# Patient Record
Sex: FEMALE | Race: WHITE | URBAN - METROPOLITAN AREA
[De-identification: names, ages, dates, MRNs, and addresses within clinical notes are randomized per-mention and may not be internally consistent; named-entity substitution may affect disease eponyms.]

---

## 2021-01-13 ENCOUNTER — OFFICE VISIT (OUTPATIENT)
Dept: URBAN - METROPOLITAN AREA CLINIC 118 | Facility: CLINIC | Age: 21
End: 2021-01-13
Payer: COMMERCIAL

## 2021-01-13 ENCOUNTER — WEB ENCOUNTER (OUTPATIENT)
Dept: URBAN - METROPOLITAN AREA CLINIC 118 | Facility: CLINIC | Age: 21
End: 2021-01-13

## 2021-01-13 DIAGNOSIS — R63.6 UNDERWEIGHT DUE TO INADEQUATE CALORIC INTAKE: ICD-10-CM

## 2021-01-13 DIAGNOSIS — K59.04 CHRONIC IDIOPATHIC CONSTIPATION: ICD-10-CM

## 2021-01-13 DIAGNOSIS — R10.84 GENERALIZED ABDOMINAL PAIN: ICD-10-CM

## 2021-01-13 PROCEDURE — 1036F TOBACCO NON-USER: CPT | Performed by: INTERNAL MEDICINE

## 2021-01-13 PROCEDURE — G8482 FLU IMMUNIZE ORDER/ADMIN: HCPCS | Performed by: INTERNAL MEDICINE

## 2021-01-13 PROCEDURE — G8418 CALC BMI BLW LOW PARAM F/U: HCPCS | Performed by: INTERNAL MEDICINE

## 2021-01-13 PROCEDURE — 99204 OFFICE O/P NEW MOD 45 MIN: CPT | Performed by: INTERNAL MEDICINE

## 2021-01-13 PROCEDURE — G8427 DOCREV CUR MEDS BY ELIG CLIN: HCPCS | Performed by: INTERNAL MEDICINE

## 2021-01-13 NOTE — HPI-TODAY'S VISIT:
The patient is a 20-year-old female who self-referred to our clinic after a recent visit to the emergency room.  She has a history of severe constipation for the least the last 5 years, which started around the time of middle school.  She she used to have bowel movements about 1-2 times per week, with significant straining and urgency.  She remembers in her childhood she had relatively normal bowel movements.  There is associated left lower quadrant discomfort, bloating, and occasional urgency when she has have a bowel movement.  She does respond well to MiraLAX or other laxatives, but she was unsure if she could take this on a daily basis, so she will reserve this for times when the constipation was especially severe.  She went to the emergency room recently for acute urinary retention, and it was determined based on imaging in the emergency room this was due to her severe constipation.  After a bowel cleansing with lactulose her urinary function returned to normal.  She has never had a colonoscopy.  She does not know of any abnormal lab work at her primary care, including no history of anemia.  She denies severe abdominal pain when she is not constipated.  She has had no abdominal surgery, and the constipation does not flare with her menstrual cycles which are regular.  She has no family history of colon cancer or inflammatory bowel disease.  The patient does have a moderate fiber diet. The patient is a 20-year-old female who self-referred to our clinic after a recent visit to the emergency room.  She has a history of severe constipation for the least the last 5 years, which started around the time of middle school.  She she used to have bowel movements about 1-2 times per week, with significant straining and urgency.  She remembers in her childhood she had relatively normal bowel movements.  There is associated left lower quadrant discomfort, bloating, and occasional urgency when she has have a bowel movement.  She does respond well to MiraLAX or other laxatives, but she was unsure if she could take this on a daily basis, so she will reserve this for times when the constipation was especially severe.  She went to the emergency room recently for acute urinary retention, and it was determined based on imaging in the emergency room this was due to her severe constipation.  After a bowel cleansing with lactulose her urinary function returned to normal.  She has never had a colonoscopy.  She does not know of any abnormal lab work at her primary care, including no history of anemia.  She denies severe abdominal pain when she is not constipated.  She has had no abdominal surgery, and the constipation does not flare with her menstrual cycles which are regular.  She has no family history of colon cancer or inflammatory bowel disease.  The patient does have a moderate fiber diet.

## 2021-01-14 LAB
BASO (ABSOLUTE): 0.1
BASOS: 1
EOS (ABSOLUTE): 0.3
EOS: 6
HEMATOCRIT: 37.9
HEMATOLOGY COMMENTS:: (no result)
HEMOGLOBIN: 10.9
IMMATURE CELLS: (no result)
IMMATURE GRANS (ABS): 0
IMMATURE GRANULOCYTES: 0
LYMPHS (ABSOLUTE): 1.9
LYMPHS: 34
MCH: 19.6
MCHC: 28.8
MCV: 68
MONOCYTES(ABSOLUTE): 0.5
MONOCYTES: 8
NEUTROPHILS (ABSOLUTE): 2.8
NEUTROPHILS: 51
NRBC: (no result)
PLATELETS: 459
RBC: 5.55
RDW: 18.5
TSH: 1.71
WBC: 5.5

## 2021-05-13 ENCOUNTER — OFFICE VISIT (OUTPATIENT)
Dept: URBAN - METROPOLITAN AREA CLINIC 118 | Facility: CLINIC | Age: 21
End: 2021-05-13
Payer: COMMERCIAL

## 2021-05-13 DIAGNOSIS — K59.04 CHRONIC IDIOPATHIC CONSTIPATION: ICD-10-CM

## 2021-05-13 PROCEDURE — 99213 OFFICE O/P EST LOW 20 MIN: CPT | Performed by: INTERNAL MEDICINE

## 2021-05-13 RX ORDER — NORETHINDRONE ACETATE AND ETHINYL ESTRADIOL 1; .02 MG/1; MG/1
TABLET ORAL
Qty: 84 UNSPECIFIED | Status: ACTIVE | COMMUNITY

## 2021-05-13 NOTE — HPI-TODAY'S VISIT:
The patient is following up after her office visit in January.  She is taking MiraLAX on a daily basis and is compliant with therapy.  She has a bowel movement every day to rarely every other day.  There is no blood in the stool, and she has much less straining and abdominal bloating and discomfort.  She has gained half a pound because her appetite is returning.  She sees no blood in the stool.

## 2021-11-11 ENCOUNTER — OFFICE VISIT (OUTPATIENT)
Dept: URBAN - METROPOLITAN AREA CLINIC 118 | Facility: CLINIC | Age: 21
End: 2021-11-11

## 2021-12-10 ENCOUNTER — OFFICE VISIT (OUTPATIENT)
Dept: URBAN - METROPOLITAN AREA CLINIC 118 | Facility: CLINIC | Age: 21
End: 2021-12-10

## 2022-02-08 ENCOUNTER — OFFICE VISIT (OUTPATIENT)
Dept: URBAN - METROPOLITAN AREA CLINIC 118 | Facility: CLINIC | Age: 22
End: 2022-02-08
Payer: COMMERCIAL

## 2022-02-08 DIAGNOSIS — K59.04 CHRONIC IDIOPATHIC CONSTIPATION: ICD-10-CM

## 2022-02-08 PROBLEM — 82934008: Status: ACTIVE | Noted: 2021-01-13

## 2022-02-08 PROCEDURE — 99213 OFFICE O/P EST LOW 20 MIN: CPT | Performed by: INTERNAL MEDICINE

## 2022-02-08 RX ORDER — SERTRALINE HYDROCHLORIDE 25 MG/1
1 TABLET TABLET, FILM COATED ORAL ONCE A DAY
Status: ACTIVE | COMMUNITY

## 2022-02-08 RX ORDER — NORETHINDRONE ACETATE AND ETHINYL ESTRADIOL 1; .02 MG/1; MG/1
TABLET ORAL
Qty: 84 UNSPECIFIED | Status: ACTIVE | COMMUNITY

## 2022-02-08 NOTE — HPI-TODAY'S VISIT:
The patient was last seen in May 2021 and has been doing well.  Her chronic constipation is much more controlled with increased fiber in the diet.  She is using MiraLAX less than once a week for her chronic constipation.  She sees no blood in the stools and has no nausea, vomiting, or significant abdominal pain.  Her weight is up half a pound from her last visit and she feels that her appetite is improved.

## 2023-05-23 ENCOUNTER — DASHBOARD ENCOUNTERS (OUTPATIENT)
Age: 23
End: 2023-05-23

## 2023-05-23 ENCOUNTER — WEB ENCOUNTER (OUTPATIENT)
Dept: URBAN - METROPOLITAN AREA CLINIC 111 | Facility: CLINIC | Age: 23
End: 2023-05-23

## 2023-05-23 ENCOUNTER — OFFICE VISIT (OUTPATIENT)
Dept: URBAN - METROPOLITAN AREA CLINIC 111 | Facility: CLINIC | Age: 23
End: 2023-05-23
Payer: COMMERCIAL

## 2023-05-23 VITALS
TEMPERATURE: 99.1 F | HEART RATE: 120 BPM | BODY MASS INDEX: 17.67 KG/M2 | HEIGHT: 60 IN | DIASTOLIC BLOOD PRESSURE: 72 MMHG | SYSTOLIC BLOOD PRESSURE: 100 MMHG | WEIGHT: 90 LBS

## 2023-05-23 DIAGNOSIS — R00.0 TACHYCARDIA: ICD-10-CM

## 2023-05-23 DIAGNOSIS — R14.0 ABDOMINAL BLOATING: ICD-10-CM

## 2023-05-23 DIAGNOSIS — R68.81 EARLY SATIETY: ICD-10-CM

## 2023-05-23 DIAGNOSIS — K59.00 CONSTIPATION, UNSPECIFIED CONSTIPATION TYPE: ICD-10-CM

## 2023-05-23 DIAGNOSIS — K59.01 CONSTIPATION: ICD-10-CM

## 2023-05-23 PROBLEM — 14760008: Status: ACTIVE | Noted: 2023-05-23

## 2023-05-23 PROCEDURE — 99213 OFFICE O/P EST LOW 20 MIN: CPT | Performed by: NURSE PRACTITIONER

## 2023-05-23 PROCEDURE — 99213 OFFICE O/P EST LOW 20 MIN: CPT | Performed by: INTERNAL MEDICINE

## 2023-05-23 RX ORDER — NORETHINDRONE ACETATE AND ETHINYL ESTRADIOL 1; .02 MG/1; MG/1
TABLET ORAL
Qty: 84 UNSPECIFIED | Status: ACTIVE | COMMUNITY

## 2023-05-23 NOTE — HPI-TODAY'S VISIT:
21 yo female presents for early satiety for 6 weeks. She feels full right away after eating. Denies nausea or vomiting. She has nausea when she forces herself to eat. Has abd bloating and constipation. Reports gaining weight last year but lost weight due to early satiety. She weighed 89lb in 2022, weighs 90lb today. Patient was last seen by Dr. Carmona in 2/22 for constipation. Denies fever, chills, abd pain, blood in stool.

## 2023-05-25 LAB
H PYLORI BREATH TEST: NOT DETECTED
H. PYLORI BREATH TEST: NOT DETECTED
INTERPRETATION: NOT DETECTED

## 2023-05-31 ENCOUNTER — TELEPHONE ENCOUNTER (OUTPATIENT)
Dept: URBAN - METROPOLITAN AREA CLINIC 23 | Facility: CLINIC | Age: 23
End: 2023-05-31

## 2023-06-01 ENCOUNTER — OFFICE VISIT (OUTPATIENT)
Dept: URBAN - METROPOLITAN AREA CLINIC 111 | Facility: CLINIC | Age: 23
End: 2023-06-01

## 2023-06-04 ENCOUNTER — TELEPHONE ENCOUNTER (OUTPATIENT)
Dept: URBAN - METROPOLITAN AREA CLINIC 111 | Facility: CLINIC | Age: 23
End: 2023-06-04

## 2023-06-23 ENCOUNTER — OFFICE VISIT (OUTPATIENT)
Dept: URBAN - METROPOLITAN AREA CLINIC 111 | Facility: CLINIC | Age: 23
End: 2023-06-23

## 2023-07-07 ENCOUNTER — OFFICE VISIT (OUTPATIENT)
Dept: URBAN - METROPOLITAN AREA CLINIC 111 | Facility: CLINIC | Age: 23
End: 2023-07-07